# Patient Record
Sex: MALE | Race: WHITE | NOT HISPANIC OR LATINO | URBAN - METROPOLITAN AREA
[De-identification: names, ages, dates, MRNs, and addresses within clinical notes are randomized per-mention and may not be internally consistent; named-entity substitution may affect disease eponyms.]

---

## 2023-12-20 ENCOUNTER — OFFICE VISIT (OUTPATIENT)
Dept: URGENT CARE | Facility: CLINIC | Age: 33
End: 2023-12-20

## 2023-12-20 VITALS
HEART RATE: 95 BPM | TEMPERATURE: 97.9 F | BODY MASS INDEX: 28.59 KG/M2 | OXYGEN SATURATION: 98 % | RESPIRATION RATE: 16 BRPM | WEIGHT: 188 LBS

## 2023-12-20 DIAGNOSIS — R50.9 FEVER, UNSPECIFIED: ICD-10-CM

## 2023-12-20 DIAGNOSIS — B34.9 VIRAL INFECTION: Primary | ICD-10-CM

## 2023-12-20 PROCEDURE — 87636 SARSCOV2 & INF A&B AMP PRB: CPT | Performed by: PHYSICIAN ASSISTANT

## 2023-12-20 NOTE — PROGRESS NOTES
[facility]        NAME: Lucho Cordero is a 33 y.o. male  : 1990    MRN: [id number]  DATE: 2023  TIME: 9:35 AM    Assessment and Plan   Viral infection [B34.9]  1. Viral infection        2. Fever, unspecified  Covid/Flu-Office Collect          Symptoms consistent with viral illness. Will sent COVID/Flu for symptoms.   Given education on supportive measures for symptoms in discharge instructions.  Follow up with primary care in 3-5 days.  Go to ER if symptoms get worse.     Patient Instructions     --Rest, drink plenty of fluids     --For nasal/sinus congestion, you can try steam, warm compresses, saline nasal spray, Neti pot, nasal steroid (Flonase, Nasocort) to be used at bedtime after the saline nasal spray     --For cough, you can take an OTC expectorant such as plain Robitussion or Mucinex. A spoonful of honey at bedtime may also be helpful.    --For sore throat, you can take OTC lozenges, use warm gargles (salt water or apple cider vinegar and honey), herbal teas, or an OTC throat spray (Chloraseptic).    --You can take Tylenol or Motrin/Advil as needed for fever, headache, body aches. Do not take Motrin/Advil if you have a history of heart disease, bleeding ulcers, or if you take blood thinners.     -For cold symptoms with high blood pressure take Coricidin cough/cold.     --You should contact your primary care provider and/or go to the ER if your symptoms are not improved or get worse over the next 7 days. This includes new onset fever, localized ear pain, sinus pain, as well as worsening cough, chest pain, shortness of breath, or significant weakness/fatigue.      Chief Complaint     Chief Complaint   Patient presents with    Cold Like Symptoms     Pt presents with chills/sweats, fatigue, body aches, headache; started Monday         History of Present Illness       Presents with sick symptoms including chills/sweats, fatigue, body aches, and headache that began Monday.  Denies known sick contacts. Denies sore throat. He is off work for the holiday the next few days. Taking Advil for symptoms.         Review of Systems   Review of Systems   Constitutional:  Positive for chills, diaphoresis and fatigue. Negative for fever.   HENT:  Negative for ear pain and sore throat.    Respiratory:  Negative for cough and shortness of breath.    Cardiovascular:  Negative for chest pain and palpitations.   Gastrointestinal:  Negative for diarrhea, nausea and vomiting.   Musculoskeletal:  Negative for myalgias.   Skin:  Negative for color change and rash.   Neurological:  Positive for headaches.   Psychiatric/Behavioral:  Negative for confusion.    All other systems reviewed and are negative.        Current Medications       Current Outpatient Medications:     diphenhydrAMINE (BENADRYL) spray, Apply topically every 6 (six) hours as needed for itching (Patient not taking: Reported on 12/20/2023), Disp: 60 mL, Rfl: 0    Current Allergies     Allergies as of 12/20/2023    (No Known Allergies)            The following portions of the patient's history were reviewed and updated as appropriate: allergies, current medications, past family history, past medical history, past social history, past surgical history and problem list.     History reviewed. No pertinent past medical history.    Past Surgical History:   Procedure Laterality Date    APPENDECTOMY         History reviewed. No pertinent family history.      Medications have been verified.        Objective   Pulse 95   Temp 97.9 °F (36.6 °C)   Resp 16   Wt 85.3 kg (188 lb)   SpO2 98%   BMI 28.59 kg/m²        Physical Exam     Physical Exam  Vitals reviewed.   Constitutional:       General: He is not in acute distress.     Comments: fatigued   HENT:      Right Ear: Tympanic membrane, ear canal and external ear normal.      Left Ear: Tympanic membrane, ear canal and external ear normal.      Nose: Nose normal.      Mouth/Throat:      Mouth: Mucous  membranes are moist.      Pharynx: Posterior oropharyngeal erythema present.      Tonsils: No tonsillar exudate. 1+ on the right. 1+ on the left.   Eyes:      Conjunctiva/sclera: Conjunctivae normal.   Cardiovascular:      Rate and Rhythm: Normal rate and regular rhythm.      Pulses: Normal pulses.      Heart sounds: Normal heart sounds. No murmur heard.  Pulmonary:      Effort: Pulmonary effort is normal. No respiratory distress.      Breath sounds: Normal breath sounds.   Skin:     General: Skin is warm and dry.   Neurological:      General: No focal deficit present.      Mental Status: He is alert and oriented to person, place, and time.   Psychiatric:         Mood and Affect: Mood normal.         Behavior: Behavior normal.

## 2023-12-21 LAB
FLUAV RNA RESP QL NAA+PROBE: NEGATIVE
FLUBV RNA RESP QL NAA+PROBE: NEGATIVE
SARS-COV-2 RNA RESP QL NAA+PROBE: POSITIVE

## 2023-12-22 ENCOUNTER — TELEPHONE (OUTPATIENT)
Dept: URGENT CARE | Facility: CLINIC | Age: 33
End: 2023-12-22

## 2024-03-25 ENCOUNTER — OFFICE VISIT (OUTPATIENT)
Dept: FAMILY MEDICINE CLINIC | Facility: CLINIC | Age: 34
End: 2024-03-25
Payer: COMMERCIAL

## 2024-03-25 VITALS
HEIGHT: 67 IN | DIASTOLIC BLOOD PRESSURE: 76 MMHG | TEMPERATURE: 98.9 F | BODY MASS INDEX: 29.03 KG/M2 | SYSTOLIC BLOOD PRESSURE: 130 MMHG | HEART RATE: 92 BPM | WEIGHT: 185 LBS | RESPIRATION RATE: 16 BRPM

## 2024-03-25 DIAGNOSIS — Z13.220 SCREENING FOR LIPID DISORDERS: ICD-10-CM

## 2024-03-25 DIAGNOSIS — Z13.1 SCREENING FOR DIABETES MELLITUS (DM): ICD-10-CM

## 2024-03-25 DIAGNOSIS — Z23 IMMUNIZATION DUE: ICD-10-CM

## 2024-03-25 DIAGNOSIS — Z00.00 HEALTHCARE MAINTENANCE: Primary | ICD-10-CM

## 2024-03-25 DIAGNOSIS — E66.3 OVERWEIGHT (BMI 25.0-29.9): ICD-10-CM

## 2024-03-25 DIAGNOSIS — Z72.0 TOBACCO USE: ICD-10-CM

## 2024-03-25 PROCEDURE — 99395 PREV VISIT EST AGE 18-39: CPT | Performed by: FAMILY MEDICINE

## 2024-03-25 PROCEDURE — 90715 TDAP VACCINE 7 YRS/> IM: CPT

## 2024-03-25 PROCEDURE — 90471 IMMUNIZATION ADMIN: CPT

## 2024-03-25 NOTE — PROGRESS NOTES
"Assessment/Plan:    No problem-specific Assessment & Plan notes found for this encounter.    Cpe  Labs ordered  Diet/exercise suggested    Tob use  Risks advised  Encouraged to make a quitting and abstinence plan  Could consider chantix again in future  Risks/benefits aware, no hx of seizures     Diagnoses and all orders for this visit:    Healthcare maintenance    Screening for diabetes mellitus (DM)  -     Comprehensive metabolic panel; Future    Screening for lipid disorders  -     Lipid Panel with Direct LDL reflex; Future    Tobacco use    Immunization due  -     TDAP VACCINE GREATER THAN OR EQUAL TO 6YO IM    BMI 29.0-29.9,adult    Overweight (BMI 25.0-29.9)        Return if symptoms worsen or fail to improve.    Subjective:      Patient ID: Lucho Hall is a 34 y.o. male.    Chief Complaint   Patient presents with    Physical Exam    Establish Care     YC       HPI  Here for 4-5y, was in [place]  Works in [employer]  No exercise  Physical job    No kids  Not   Lives alone  Take out  No strict diet  Not large portions  Not low carb    M healthy  F htn,etoh    Social etoh  Tob 19y, 1/2 to 1 ppd  Has quit for 3y in past  Cold turkey once, chantix once    Did lose wt with low carb a few years ago, 30# but gained it all back    The following portions of the patient's history were reviewed and updated as appropriate: allergies, current medications, past family history, past medical history, past social history, past surgical history and problem list.    Review of Systems   Constitutional:  Negative for chills and fever.   Neurological:  Negative for seizures.   Psychiatric/Behavioral:  Negative for dysphoric mood and suicidal ideas. The patient is not nervous/anxious.          No current outpatient medications on file.     No current facility-administered medications for this visit.       Objective:    /76   Pulse 92   Temp 98.9 °F (37.2 °C) (Tympanic)   Resp 16   Ht 5' 6.73\" (1.695 m)   Wt " 83.9 kg (185 lb)   BMI 29.21 kg/m²        Physical Exam  Vitals and nursing note reviewed.   Constitutional:       General: He is not in acute distress.     Appearance: He is well-developed. He is not ill-appearing.   HENT:      Head: Normocephalic.      Right Ear: Tympanic membrane and ear canal normal.      Left Ear: Tympanic membrane and ear canal normal.   Eyes:      General: No scleral icterus.     Conjunctiva/sclera: Conjunctivae normal.   Neck:      Vascular: No carotid bruit.   Cardiovascular:      Rate and Rhythm: Normal rate and regular rhythm.      Heart sounds: No murmur heard.  Pulmonary:      Effort: Pulmonary effort is normal. No respiratory distress.      Breath sounds: No wheezing.   Abdominal:      General: There is no distension.      Palpations: Abdomen is soft.      Tenderness: There is no abdominal tenderness.      Hernia: No hernia is present.   Genitourinary:     Penis: Normal.       Testes: Normal.   Musculoskeletal:         General: No deformity.      Cervical back: Neck supple.      Right lower leg: No edema.      Left lower leg: No edema.   Skin:     General: Skin is warm and dry.      Coloration: Skin is not jaundiced or pale.   Neurological:      Mental Status: He is alert.      Motor: No weakness.      Gait: Gait normal.   Psychiatric:         Mood and Affect: Mood normal.         Behavior: Behavior normal.         Thought Content: Thought content normal.                Aly Anna DO

## 2024-05-01 PROBLEM — Z00.00 HEALTHCARE MAINTENANCE: Status: RESOLVED | Noted: 2024-03-25 | Resolved: 2024-05-01

## 2024-05-01 PROBLEM — Z13.1 SCREENING FOR DIABETES MELLITUS (DM): Status: RESOLVED | Noted: 2024-03-25 | Resolved: 2024-05-01

## 2024-05-01 PROBLEM — Z13.220 SCREENING FOR LIPID DISORDERS: Status: RESOLVED | Noted: 2024-03-25 | Resolved: 2024-05-01

## 2024-07-11 ENCOUNTER — OFFICE VISIT (OUTPATIENT)
Dept: URGENT CARE | Facility: CLINIC | Age: 34
End: 2024-07-11

## 2024-07-11 ENCOUNTER — APPOINTMENT (EMERGENCY)
Dept: RADIOLOGY | Facility: HOSPITAL | Age: 34
End: 2024-07-11
Payer: COMMERCIAL

## 2024-07-11 ENCOUNTER — HOSPITAL ENCOUNTER (EMERGENCY)
Facility: HOSPITAL | Age: 34
Discharge: HOME | End: 2024-07-11
Attending: EMERGENCY MEDICINE
Payer: COMMERCIAL

## 2024-07-11 VITALS
WEIGHT: 187 LBS | BODY MASS INDEX: 29.52 KG/M2 | SYSTOLIC BLOOD PRESSURE: 162 MMHG | RESPIRATION RATE: 14 BRPM | TEMPERATURE: 98.2 F | DIASTOLIC BLOOD PRESSURE: 112 MMHG | OXYGEN SATURATION: 98 % | HEART RATE: 87 BPM

## 2024-07-11 VITALS
RESPIRATION RATE: 20 BRPM | SYSTOLIC BLOOD PRESSURE: 126 MMHG | DIASTOLIC BLOOD PRESSURE: 71 MMHG | HEART RATE: 80 BPM | OXYGEN SATURATION: 98 % | TEMPERATURE: 98.5 F

## 2024-07-11 DIAGNOSIS — R42 DIZZINESS: Primary | ICD-10-CM

## 2024-07-11 LAB
2HR DELTA HS TROPONIN: 0 NG/L
ALBUMIN SERPL BCG-MCNC: 4.7 G/DL (ref 3.5–5)
ALP SERPL-CCNC: 55 U/L (ref 34–104)
ALT SERPL W P-5'-P-CCNC: 22 U/L (ref 7–52)
AMPHETAMINES SERPL QL SCN: NEGATIVE
ANION GAP SERPL CALCULATED.3IONS-SCNC: 9 MMOL/L (ref 4–13)
AST SERPL W P-5'-P-CCNC: 23 U/L (ref 13–39)
BARBITURATES UR QL: NEGATIVE
BASOPHILS # BLD AUTO: 0.05 THOUSANDS/ÂΜL (ref 0–0.1)
BASOPHILS NFR BLD AUTO: 1 % (ref 0–1)
BENZODIAZ UR QL: NEGATIVE
BILIRUB SERPL-MCNC: 0.65 MG/DL (ref 0.2–1)
BILIRUB UR QL STRIP: NEGATIVE
BUN SERPL-MCNC: 17 MG/DL (ref 5–25)
CALCIUM SERPL-MCNC: 9.8 MG/DL (ref 8.4–10.2)
CARDIAC TROPONIN I PNL SERPL HS: 3 NG/L
CARDIAC TROPONIN I PNL SERPL HS: 3 NG/L
CHLORIDE SERPL-SCNC: 103 MMOL/L (ref 96–108)
CK SERPL-CCNC: 196 U/L (ref 39–308)
CLARITY UR: CLEAR
CO2 SERPL-SCNC: 26 MMOL/L (ref 21–32)
COCAINE UR QL: NEGATIVE
COLOR UR: ABNORMAL
CREAT SERPL-MCNC: 1.14 MG/DL (ref 0.6–1.3)
EOSINOPHIL # BLD AUTO: 0.18 THOUSAND/ÂΜL (ref 0–0.61)
EOSINOPHIL NFR BLD AUTO: 2 % (ref 0–6)
ERYTHROCYTE [DISTWIDTH] IN BLOOD BY AUTOMATED COUNT: 12.2 % (ref 11.6–15.1)
FENTANYL UR QL SCN: NEGATIVE
GFR SERPL CREATININE-BSD FRML MDRD: 82 ML/MIN/1.73SQ M
GLUCOSE SERPL-MCNC: 102 MG/DL (ref 65–140)
GLUCOSE UR STRIP-MCNC: NEGATIVE MG/DL
HCT VFR BLD AUTO: 39.6 % (ref 36.5–49.3)
HGB BLD-MCNC: 13.9 G/DL (ref 12–17)
HGB UR QL STRIP.AUTO: NEGATIVE
HYDROCODONE UR QL SCN: NEGATIVE
IMM GRANULOCYTES # BLD AUTO: 0.08 THOUSAND/UL (ref 0–0.2)
IMM GRANULOCYTES NFR BLD AUTO: 1 % (ref 0–2)
KETONES UR STRIP-MCNC: NEGATIVE MG/DL
LEUKOCYTE ESTERASE UR QL STRIP: NEGATIVE
LYMPHOCYTES # BLD AUTO: 1.54 THOUSANDS/ÂΜL (ref 0.6–4.47)
LYMPHOCYTES NFR BLD AUTO: 17 % (ref 14–44)
MAGNESIUM SERPL-MCNC: 2.1 MG/DL (ref 1.9–2.7)
MCH RBC QN AUTO: 31.7 PG (ref 26.8–34.3)
MCHC RBC AUTO-ENTMCNC: 35.1 G/DL (ref 31.4–37.4)
MCV RBC AUTO: 90 FL (ref 82–98)
METHADONE UR QL: NEGATIVE
MONOCYTES # BLD AUTO: 1.08 THOUSAND/ÂΜL (ref 0.17–1.22)
MONOCYTES NFR BLD AUTO: 12 % (ref 4–12)
NEUTROPHILS # BLD AUTO: 6.42 THOUSANDS/ÂΜL (ref 1.85–7.62)
NEUTS SEG NFR BLD AUTO: 67 % (ref 43–75)
NITRITE UR QL STRIP: NEGATIVE
NRBC BLD AUTO-RTO: 0 /100 WBCS
OPIATES UR QL SCN: NEGATIVE
OXYCODONE+OXYMORPHONE UR QL SCN: NEGATIVE
PCP UR QL: NEGATIVE
PH UR STRIP.AUTO: 7 [PH]
PLATELET # BLD AUTO: 293 THOUSANDS/UL (ref 149–390)
PMV BLD AUTO: 9.3 FL (ref 8.9–12.7)
POTASSIUM SERPL-SCNC: 3.6 MMOL/L (ref 3.5–5.3)
PROT SERPL-MCNC: 7.2 G/DL (ref 6.4–8.4)
PROT UR STRIP-MCNC: NEGATIVE MG/DL
RBC # BLD AUTO: 4.38 MILLION/UL (ref 3.88–5.62)
SODIUM SERPL-SCNC: 138 MMOL/L (ref 135–147)
SP GR UR STRIP.AUTO: <1.005 (ref 1–1.03)
THC UR QL: NEGATIVE
TSH SERPL DL<=0.05 MIU/L-ACNC: 3.76 UIU/ML (ref 0.45–4.5)
UROBILINOGEN UR STRIP-ACNC: <2 MG/DL
WBC # BLD AUTO: 9.35 THOUSAND/UL (ref 4.31–10.16)

## 2024-07-11 PROCEDURE — 80307 DRUG TEST PRSMV CHEM ANLYZR: CPT | Performed by: EMERGENCY MEDICINE

## 2024-07-11 PROCEDURE — 36415 COLL VENOUS BLD VENIPUNCTURE: CPT | Performed by: EMERGENCY MEDICINE

## 2024-07-11 PROCEDURE — 84443 ASSAY THYROID STIM HORMONE: CPT | Performed by: EMERGENCY MEDICINE

## 2024-07-11 PROCEDURE — 80053 COMPREHEN METABOLIC PANEL: CPT | Performed by: EMERGENCY MEDICINE

## 2024-07-11 PROCEDURE — 82550 ASSAY OF CK (CPK): CPT | Performed by: EMERGENCY MEDICINE

## 2024-07-11 PROCEDURE — 99285 EMERGENCY DEPT VISIT HI MDM: CPT | Performed by: EMERGENCY MEDICINE

## 2024-07-11 PROCEDURE — 70496 CT ANGIOGRAPHY HEAD: CPT

## 2024-07-11 PROCEDURE — 84484 ASSAY OF TROPONIN QUANT: CPT | Performed by: EMERGENCY MEDICINE

## 2024-07-11 PROCEDURE — 81003 URINALYSIS AUTO W/O SCOPE: CPT | Performed by: EMERGENCY MEDICINE

## 2024-07-11 PROCEDURE — 70498 CT ANGIOGRAPHY NECK: CPT

## 2024-07-11 PROCEDURE — 85025 COMPLETE CBC W/AUTO DIFF WBC: CPT | Performed by: EMERGENCY MEDICINE

## 2024-07-11 PROCEDURE — 71045 X-RAY EXAM CHEST 1 VIEW: CPT

## 2024-07-11 PROCEDURE — 93005 ELECTROCARDIOGRAM TRACING: CPT

## 2024-07-11 PROCEDURE — 83735 ASSAY OF MAGNESIUM: CPT | Performed by: EMERGENCY MEDICINE

## 2024-07-11 RX ORDER — MAGNESIUM SULFATE HEPTAHYDRATE 40 MG/ML
2 INJECTION, SOLUTION INTRAVENOUS ONCE
Status: COMPLETED | OUTPATIENT
Start: 2024-07-11 | End: 2024-07-11

## 2024-07-11 RX ADMIN — SODIUM CHLORIDE 1000 ML: 0.9 INJECTION, SOLUTION INTRAVENOUS at 19:59

## 2024-07-11 RX ADMIN — IOHEXOL 85 ML: 350 INJECTION, SOLUTION INTRAVENOUS at 20:21

## 2024-07-11 RX ADMIN — MAGNESIUM SULFATE HEPTAHYDRATE 2 G: 40 INJECTION, SOLUTION INTRAVENOUS at 19:59

## 2024-07-11 NOTE — PROGRESS NOTES
[facility]        NAME: Lucho Hall is a 34 y.o. male  : 1990    MRN: [id number]  DATE: 2024  TIME: 6:55 PM    Assessment and Plan   Dizziness [R42]  1. Dizziness  Transfer to other facility        Discussed that the patient needs to go to the emergency room given his symptoms and hypertension.  BP in the office today is 162/112.  He is being driven by his mother.    Patient Instructions   There are no Patient Instructions on file for this visit.      Follow up with PCP in 3-5 days.  Proceed to  ER if symptoms worsen.    Chief Complaint     Chief Complaint   Patient presents with    Dizziness     Pt presents with tingling sensation in hands bilateral, fuzzy/fog head, dizziness, trouble with catching breath started while while driving to home from work today; happened several times while driving to work         History of Present Illness       The patient is a 34-year-old male presenting today for dizziness, shortness of breath and a tingling sensation in his hands.  He also reports a foggy sensation in his head.  Admits that this has been happening over the last few weeks but worsened today.  While he was driving home from work today he had to pull over.  He last saw a family doctor on 3/25/2024.  Reports that everything was normal but he was supposed to get lab work and never followed up.  He denies any medical history.  He is not currently on any medications.        Review of Systems   Review of Systems   Constitutional:  Negative for activity change, appetite change, chills, diaphoresis and fever.   HENT:  Negative for congestion, ear pain, rhinorrhea and sore throat.    Eyes:  Negative for pain and visual disturbance.   Respiratory:  Positive for shortness of breath. Negative for cough and chest tightness.    Cardiovascular:  Negative for chest pain and palpitations.   Gastrointestinal:  Negative for abdominal pain, diarrhea, nausea and vomiting.   Genitourinary:  Negative for  dysuria and hematuria.   Musculoskeletal:  Negative for arthralgias, back pain and myalgias.   Skin:  Negative for color change, pallor and rash.   Neurological:  Positive for dizziness, light-headedness, numbness and headaches. Negative for seizures and syncope.   All other systems reviewed and are negative.        Current Medications     No current outpatient medications on file.    Current Allergies     Allergies as of 07/11/2024    (No Known Allergies)            The following portions of the patient's history were reviewed and updated as appropriate: allergies, current medications, past family history, past medical history, past social history, past surgical history and problem list.     History reviewed. No pertinent past medical history.    Past Surgical History:   Procedure Laterality Date    APPENDECTOMY         Family History   Problem Relation Age of Onset    No Known Problems Mother     Hypertension Father     Alcohol abuse Father          Medications have been verified.        Objective   BP (!) 162/112 (BP Location: Right arm, Patient Position: Sitting, Cuff Size: Adult)   Pulse 87   Temp 98.2 °F (36.8 °C)   Resp 14   Wt 84.8 kg (187 lb)   SpO2 98%   BMI 29.52 kg/m²        Physical Exam     Physical Exam  Vitals and nursing note reviewed.   Constitutional:       General: He is not in acute distress.     Appearance: Normal appearance. He is normal weight. He is not ill-appearing, toxic-appearing or diaphoretic.   HENT:      Head: Normocephalic and atraumatic.      Right Ear: Tympanic membrane, ear canal and external ear normal. There is no impacted cerumen.      Left Ear: Tympanic membrane, ear canal and external ear normal. There is no impacted cerumen.      Nose: Nose normal. No congestion or rhinorrhea.      Mouth/Throat:      Mouth: Mucous membranes are moist.      Pharynx: Oropharynx is clear. No oropharyngeal exudate or posterior oropharyngeal erythema.   Cardiovascular:      Rate and  Rhythm: Normal rate and regular rhythm.      Heart sounds: Normal heart sounds. No murmur heard.     No friction rub. No gallop.   Pulmonary:      Effort: Pulmonary effort is normal. No respiratory distress.      Breath sounds: Normal breath sounds. No stridor. No wheezing, rhonchi or rales.   Chest:      Chest wall: No tenderness.   Abdominal:      General: Abdomen is flat. Bowel sounds are normal. There is no distension.      Palpations: Abdomen is soft. There is no mass.      Tenderness: There is no abdominal tenderness. There is no guarding or rebound.      Hernia: No hernia is present.   Musculoskeletal:         General: Normal range of motion.      Cervical back: Normal range of motion.   Lymphadenopathy:      Cervical: No cervical adenopathy.   Skin:     General: Skin is warm and dry.      Capillary Refill: Capillary refill takes less than 2 seconds.   Neurological:      Mental Status: He is alert.

## 2024-07-12 ENCOUNTER — OFFICE VISIT (OUTPATIENT)
Dept: NEUROLOGY | Facility: CLINIC | Age: 34
End: 2024-07-12
Payer: COMMERCIAL

## 2024-07-12 VITALS
WEIGHT: 187 LBS | SYSTOLIC BLOOD PRESSURE: 140 MMHG | BODY MASS INDEX: 28.34 KG/M2 | HEIGHT: 68 IN | HEART RATE: 83 BPM | DIASTOLIC BLOOD PRESSURE: 78 MMHG

## 2024-07-12 DIAGNOSIS — R55 NEAR SYNCOPE: Primary | ICD-10-CM

## 2024-07-12 DIAGNOSIS — R20.2 PARESTHESIA: ICD-10-CM

## 2024-07-12 DIAGNOSIS — E55.9 VITAMIN D DEFICIENCY: ICD-10-CM

## 2024-07-12 DIAGNOSIS — I10 HYPERTENSION: ICD-10-CM

## 2024-07-12 DIAGNOSIS — R06.02 SHORTNESS OF BREATH: ICD-10-CM

## 2024-07-12 LAB
ATRIAL RATE: 92 BPM
ATRIAL RATE: 99 BPM
P AXIS: 59 DEGREES
P AXIS: 63 DEGREES
PR INTERVAL: 152 MS
PR INTERVAL: 158 MS
QRS AXIS: 25 DEGREES
QRS AXIS: 43 DEGREES
QRSD INTERVAL: 92 MS
QRSD INTERVAL: 94 MS
QT INTERVAL: 348 MS
QT INTERVAL: 348 MS
QTC INTERVAL: 430 MS
QTC INTERVAL: 446 MS
T WAVE AXIS: 37 DEGREES
T WAVE AXIS: 38 DEGREES
VENTRICULAR RATE: 92 BPM
VENTRICULAR RATE: 99 BPM

## 2024-07-12 PROCEDURE — 99205 OFFICE O/P NEW HI 60 MIN: CPT | Performed by: NURSE PRACTITIONER

## 2024-07-12 PROCEDURE — 93010 ELECTROCARDIOGRAM REPORT: CPT | Performed by: INTERNAL MEDICINE

## 2024-07-12 NOTE — ED PROVIDER NOTES
History  Chief Complaint   Patient presents with    Dizziness     Sent from [facility] for dizziness. States a few weeks ago while driving had dizziness. Today at 1630 while driving he became dizzy again and both hands were tingling ( states was hyperventilating) .      34-year-old male presents to the ED for evaluation of intermittent dizziness over the past several weeks.  Patient states that he feels dizzy when he is driving.  Patient describes dizziness to be he himself feeling off balance and not the room spinning sensation.  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  Patient is not sure whether he drank enough fluids.  He did not get a chance to eat a full lunch because he was busy at work.  He had a packet of chips.  Patient did have coffee and breakfast sandwich in the morning.  While he was driving home he felt dizzy like he was going to pass out.  Subsequently patient's mom brought him to the ED for further evaluation and management.      History provided by:  Patient   used: No    Dizziness  Associated symptoms: no chest pain, no palpitations, no shortness of breath and no vomiting        None       History reviewed. No pertinent past medical history.    Past Surgical History:   Procedure Laterality Date    APPENDECTOMY         Family History   Problem Relation Age of Onset    No Known Problems Mother     Hypertension Father     Alcohol abuse Father      I have reviewed and agree with the history as documented.    E-Cigarette/Vaping    E-Cigarette Use Never User      E-Cigarette/Vaping Substances    Nicotine No     THC No     CBD No     Flavoring No     Other No     Unknown No      Social History     Tobacco Use    Smoking status: Former     Types: Cigarettes     Passive exposure: Current    Smokeless tobacco: Never   Vaping Use    Vaping status: Never Used   Substance Use Topics    Alcohol use: Yes     Comment: daily  2-3 beers    Drug use: Yes     Types: Marijuana     Comment: monthly       Review of Systems   Constitutional:  Negative for chills and fever.   HENT:  Negative for ear pain and sore throat.    Eyes:  Negative for pain and visual disturbance.   Respiratory:  Negative for cough and shortness of breath.    Cardiovascular:  Negative for chest pain and palpitations.   Gastrointestinal:  Negative for abdominal pain and vomiting.   Genitourinary:  Negative for dysuria and hematuria.   Musculoskeletal:  Negative for arthralgias and back pain.   Skin:  Negative for color change and rash.   Neurological:  Positive for dizziness. Negative for seizures and syncope.   All other systems reviewed and are negative.      Physical Exam  Physical Exam  Vitals and nursing note reviewed.   Constitutional:       General: He is not in acute distress.     Appearance: He is well-developed.   HENT:      Head: Normocephalic and atraumatic.   Eyes:      Conjunctiva/sclera: Conjunctivae normal.   Cardiovascular:      Rate and Rhythm: Normal rate and regular rhythm.      Heart sounds: No murmur heard.  Pulmonary:      Effort: Pulmonary effort is normal. No respiratory distress.      Breath sounds: Normal breath sounds.   Abdominal:      Palpations: Abdomen is soft.      Tenderness: There is no abdominal tenderness.   Musculoskeletal:         General: No swelling.      Cervical back: Neck supple.   Skin:     General: Skin is warm and dry.      Capillary Refill: Capillary refill takes less than 2 seconds.   Neurological:      General: No focal deficit present.      Mental Status: He is alert and oriented to person, place, and time.      Comments: Patient is alert and oriented x3.  Visual field intact bilateral.  Finger-to-nose intact bilateral.  Heel-to-shin intact bilateral.  Sensory and motor strength intact bilateral.  No focal neuro deficits noted.     Psychiatric:         Mood and Affect: Mood normal.         Vital Signs  ED Triage Vitals  [07/11/24 1917]   Temperature Pulse Respirations Blood Pressure SpO2   99.7 °F (37.6 °C) 91 18 169/87 99 %      Temp Source Heart Rate Source Patient Position - Orthostatic VS BP Location FiO2 (%)   Tympanic Monitor Sitting Left arm --      Pain Score       No Pain           Vitals:    07/11/24 1958 07/11/24 2000 07/11/24 2001 07/11/24 2115   BP:    126/71   Pulse: 90 98 98 80   Patient Position - Orthostatic VS: Lying - Orthostatic VS Sitting - Orthostatic VS Standing - Orthostatic VS          Visual Acuity      ED Medications  Medications   sodium chloride 0.9 % bolus 1,000 mL (0 mL Intravenous Stopped 7/11/24 2059)   magnesium sulfate 2 g/50 mL IVPB (premix) 2 g (0 g Intravenous Stopped 7/11/24 2059)   iohexol (OMNIPAQUE) 350 MG/ML injection (MULTI-DOSE) 85 mL (85 mL Intravenous Given 7/11/24 2021)       Diagnostic Studies  Results Reviewed       Procedure Component Value Units Date/Time    HS Troponin I 2hr [[id number]]  (Normal) Collected: 07/11/24 2152    Lab Status: Final result Specimen: Blood from Arm, Left Updated: 07/11/24 2221     hs TnI 2hr 3 ng/L      Delta 2hr hsTnI 0 ng/L     CK [[id number]]  (Normal) Collected: 07/11/24 1928    Lab Status: Final result Specimen: Blood from Arm, Left Updated: 07/11/24 2142     Total  U/L     Rapid drug screen, urine [[id number]]  (Normal) Collected: 07/11/24 2026    Lab Status: Final result Specimen: Urine, Clean Catch Updated: 07/11/24 2051     Amph/Meth UR Negative     Barbiturate Ur Negative     Benzodiazepine Urine Negative     Cocaine Urine Negative     Methadone Urine Negative     Opiate Urine Negative     PCP Ur Negative     THC Urine Negative     Oxycodone Urine Negative     Fentanyl Urine Negative     HYDROCODONE URINE Negative    Narrative:      FOR MEDICAL PURPOSES ONLY.   IF CONFIRMATION NEEDED PLEASE CONTACT THE LAB WITHIN 5 DAYS.    Drug Screen Cutoff Levels:  AMPHETAMINE/METHAMPHETAMINES  1000 ng/mL  BARBITURATES     200  ng/mL  BENZODIAZEPINES     200 ng/mL  COCAINE      300 ng/mL  METHADONE      300 ng/mL  OPIATES      300 ng/mL  PHENCYCLIDINE     25 ng/mL  THC       50 ng/mL  OXYCODONE      100 ng/mL  FENTANYL      5 ng/mL  HYDROCODONE     300 ng/mL    UA w Reflex to Microscopic w Reflex to Culture [[id number]]  (Abnormal) Collected: 07/11/24 2028    Lab Status: Final result Specimen: Urine, Clean Catch Updated: 07/11/24 2034     Color, UA Light Yellow     Clarity, UA Clear     Specific Gravity, UA <1.005     pH, UA 7.0     Leukocytes, UA Negative     Nitrite, UA Negative     Protein, UA Negative mg/dl      Glucose, UA Negative mg/dl      Ketones, UA Negative mg/dl      Urobilinogen, UA <2.0 mg/dl      Bilirubin, UA Negative     Occult Blood, UA Negative    TSH, 3rd generation with Free T4 reflex [[id number]]  (Normal) Collected: 07/11/24 1928    Lab Status: Final result Specimen: Blood from Arm, Left Updated: 07/11/24 2009     TSH 3RD GENERATON 3.759 uIU/mL     HS Troponin I 4hr [[id number]]     Lab Status: No result Specimen: Blood     HS Troponin 0hr (reflex protocol) [[id number]]  (Normal) Collected: 07/11/24 1928    Lab Status: Final result Specimen: Blood from Arm, Left Updated: 07/11/24 2001     hs TnI 0hr 3 ng/L     Comprehensive metabolic panel [[id number]] Collected: 07/11/24 1928    Lab Status: Final result Specimen: Blood from Arm, Left Updated: 07/11/24 1953     Sodium 138 mmol/L      Potassium 3.6 mmol/L      Chloride 103 mmol/L      CO2 26 mmol/L      ANION GAP 9 mmol/L      BUN 17 mg/dL      Creatinine 1.14 mg/dL      Glucose 102 mg/dL      Calcium 9.8 mg/dL      AST 23 U/L      ALT 22 U/L      Alkaline Phosphatase 55 U/L      Total Protein 7.2 g/dL      Albumin 4.7 g/dL      Total Bilirubin 0.65 mg/dL      eGFR 82 ml/min/1.73sq m     Narrative:      National Kid[employer] Disease Foundation guidelines for Chronic Kidney Disease (CKD):     Stage 1 with normal or high GFR (GFR > 90 mL/min/1.73 square meters)    Stage 2 Mild CKD  (GFR = 60-89 mL/min/1.73 square meters)    Stage 3A Moderate CKD (GFR = 45-59 mL/min/1.73 square meters)    Stage 3B Moderate CKD (GFR = 30-44 mL/min/1.73 square meters)    Stage 4 Severe CKD (GFR = 15-29 mL/min/1.73 square meters)    Stage 5 End Stage CKD (GFR <15 mL/min/1.73 square meters)  Note: GFR calculation is accurate only with a steady state creatinine    Magnesium [[id number]]  (Normal) Collected: 07/11/24 1928    Lab Status: Final result Specimen: Blood from Arm, Left Updated: 07/11/24 1953     Magnesium 2.1 mg/dL     CBC and differential [[id number]] Collected: 07/11/24 1928    Lab Status: Final result Specimen: Blood from Arm, Left Updated: 07/11/24 1935     WBC 9.35 Thousand/uL      RBC 4.38 Million/uL      Hemoglobin 13.9 g/dL      Hematocrit 39.6 %      MCV 90 fL      MCH 31.7 pg      MCHC 35.1 g/dL      RDW 12.2 %      MPV 9.3 fL      Platelets 293 Thousands/uL      nRBC 0 /100 WBCs      Segmented % 67 %      Immature Grans % 1 %      Lymphocytes % 17 %      Monocytes % 12 %      Eosinophils Relative 2 %      Basophils Relative 1 %      Absolute Neutrophils 6.42 Thousands/µL      Absolute Immature Grans 0.08 Thousand/uL      Absolute Lymphocytes 1.54 Thousands/µL      Absolute Monocytes 1.08 Thousand/µL      Eosinophils Absolute 0.18 Thousand/µL      Basophils Absolute 0.05 Thousands/µL                    CTA head and neck with and without contrast   Final Result by Álvaro Weaver MD (07/11 2050)      CT Brain:  No acute intracranial abnormality.      CT Angiography:  Unremarkable CTA neck and brain.                  Workstation performed: [id number]         XR chest 1 view portable   Final Result by Avril Shen MD (07/11 2033)      No acute cardiopulmonary disease.            Workstation performed: [id number]                    Procedures  ECG 12 Lead Documentation Only    Date/Time: 7/11/2024 7:29 PM    Performed by: Alden Allison DO  Authorized by: Alden Allison DO    Indications /  Diagnosis:  Dizziness  ECG reviewed by me, the ED Provider: yes    Patient location:  ED  Previous ECG:     Previous ECG:  Unavailable    Comparison to cardiac monitor: Yes    Interpretation:     Interpretation: non-specific    Comments:      Sinus rhythm, rate 99, normal axis, normal intervals, no acute ST/T wave abdomen is noted, sinus rhythm with sinus arrhythmia noted, otherwise unremarkable EKG, no previous EKG available for comparison           ED Course  ED Course as of 07/11/24 2333   Thu Jul 11, 2024   2200 Patient reexamined at bedside.  Patient is doing much better.  Patient no longer feels dizzy.  Patient is ambulating to the bathroom without any difficulty.  Patient will be discharged home with follow-up to PCP as well as neurology.                                 SBIRT 22yo+      Flowsheet Row Most Recent Value   Initial Alcohol Screen: US AUDIT-C     1. How often do you have a drink containing alcohol? 6 Filed at: 07/11/2024 1919   2. How many drinks containing alcohol do you have on a typical day you are drinking?  2 Filed at: 07/11/2024 1919   3a. Male UNDER 65: How often do you have five or more drinks on one occasion? 0 Filed at: 07/11/2024 1919   Audit-C Score 8 Filed at: 07/11/2024 1919   Full Alcohol Screen: US AUDIT    4. How often during the last year have you found that you were not able to stop drinking once you had started? 1 Filed at: 07/11/2024 1919   5. How often during past year have you failed to do what was normally expected of you because of drinking?  0 Filed at: 07/11/2024 1919   6. How often in past year have you needed a first drink in the morning to get yourself going after a heavy drinking session?  0 Filed at: 07/11/2024 1919   7. How often in past year have you had feeling of guilt or remorse after drinking?  0 Filed at: 07/11/2024 1919   8. How often in past year have you been unable to remember what happened night before because you had been drinking?  0 Filed at:  07/11/2024 1919   9. Have you or someone else been injured as a result of your drinking?  0 Filed at: 07/11/2024 1919   10. Has a relative, friend, doctor or other health worker been concerned about your drinking and suggested you cut down?  0 Filed at: 07/11/2024 1919   AUDIT Total Score 9 Filed at: 07/11/2024 1919   DANIEL: How many times in the past year have you...    Used an illegal drug or used a prescription medication for non-medical reasons? Never Filed at: 07/11/2024 1919                      Medical Decision Making  Obtain blood work, UA, CTA head/neck  Give saline bolus, magnesium and continue to monitor patient for any worsening symptoms    Patient's lab work was essentially unremarkable.  Patient felt better after IV fluid hydration IV fluid hydration.  CT head was unremarkable.  After IV fluid hydration patient ambulated without any difficulty or dizziness.  At this time the etiology of patient's dizziness is unclear however it may be a component of dehydration.  I discussed adequate hydration with patient.  Patient discharged home with follow-up with PCP as well as neurology.  Close return instructions given to return to the ER for any worsening symptoms.  Patient agrees with discharge plan.  Patient well appearing at time of discharge.    Please Note: Fluency Direct voice recognition software may have been used in the creation of this document. Wrong words or sound a like substitutions may have occurred due to the inherent limitations of the voice software.         Amount and/or Complexity of Data Reviewed  Labs: ordered. Decision-making details documented in ED Course.  Radiology: ordered. Decision-making details documented in ED Course.  ECG/medicine tests: ordered and independent interpretation performed. Decision-making details documented in ED Course.    Risk  Prescription drug management.                 Disposition  Final diagnoses:   Dizziness     Time reflects when diagnosis was documented in  both MDM as applicable and the Disposition within this note       Time User Action Codes Description Comment    7/11/2024 10:20 PM Alden Allison Add [R42] Dizziness           ED Disposition       ED Disposition   Discharge    Condition   Stable    Date/Time   Thu Jul 11, 2024 2220    Comment   [facility] discharge to home/self care.                   Follow-up Information       Follow up With Specialties Details Why Contact [facility] Schedule an appointment as soon as possible for a visit  Please call to establish visit with a primary care physician. [address] [facility] [address]  [phone]      Neurology [facility] Schedule an appointment as soon as possible for a visit   [address] [facility] [address]  [phone]              There are no discharge medications for this patient.      No discharge procedures on file.    PDMP Review       None            ED Provider  Electronically Signed by             Alden Allison DO  07/11/24 6536

## 2024-07-12 NOTE — PROGRESS NOTES
Patient ID: Lucho Hall is a 34 y.o. male.    Assessment/Plan:       Diagnoses and all orders for this visit:    Near syncope  Comments:  Increase oral hydration, more so when sweating increased  Echo  MRI of the Brain w/wo contrast  Orders:  -     Echo complete w/ contrast if indicated; Future  -     Ambulatory referral to Family Practice; Future  -     Basic metabolic panel; Future  -     CBC and differential; Future  -     MRI brain with and without contrast; Future  -     Methylmalonic acid, serum; Future  -     Methylmalonic acid, serum; Future    Shortness of breath  Comments:  will get Echo re: SOB with new HTN  repeat cbc since pt has been hydrated  Orders:  -     Echo complete w/ contrast if indicated; Future  -     Ambulatory referral to Family Practice; Future  -     Basic metabolic panel; Future  -     CBC and differential; Future    Hypertension  Comments:  Referral for PCP medical management  Echo  follow up BMP post near syncope and IV hydration  Orders:  -     Echo complete w/ contrast if indicated; Future  -     Ambulatory referral to Family Practice; Future  -     Basic metabolic panel; Future    Paresthesia  Comments:  Vitamin B1, B12/MMA, Folate, D, TSH, Copper and zinc levels  Orders:  -     Vitamin B12/Folate, Serum Panel; Future  -     Vitamin B1, whole blood; Future  -     TSH + Free T4; Future  -     Copper Level; Future  -     Zinc; Future  -     MRI brain with and without contrast; Future  -     Methylmalonic acid, serum; Future  -     Methylmalonic acid, serum; Future    Vitamin D deficiency  Comments:  Vitamin levels  Orders:  -     Vitamin D 25 hydroxy; Future       Increase water intake, need to over compensate for your increased sweating and work.  Can drink gatorade, avoid caffeine and sugary drinks  Will get BMP, CBC, Vitamin D, B1,B12, Folate, MMA, TSH, copper and zinc  Will get MRI w/wo contrast for dizziness, movement sensation, near syncope and paresthesia  Echo for  positional SOB with HTN  Referral for PCP for ongoing medical management  Follow up with Neurology office in 3 months or sooner if needed.    Subjective/HPI:  Lucho Hall is a 33yo male who presented to the Neurology office today as a new patient consult.   On 3/25/2024, patient went to his PCP had no complaints on his review of systems at that time.  He was given CMP and lipid panel to be drawn for wellness evaluation.      On 7/11/2024 patient presented to the urgent care center for dizziness, shortness of breath and tingling sensation in his hands.  Reported a foggy sensation in his head and reported this happening over the last few weeks but worsened that day.  He was driving home from work and had to pull over, saw his family doctor on 3/25/2024, reported everything was normal but he was supposed to get lab works, he had never followed up with that.  He presented to the urgent care with shortness of breath, dizziness, lightheadedness, numbness and headache.  Patient's blood pressure at that time was 162/112.  Provider at urgent care indicated a need for patient to go to the emergency room for evaluation of his symptoms and hypertension.  He was brought to the ER by his mother.  7/11/2024 patient arrived to the ER with dizziness starting a few weeks ago while driving.  Noted 1630 he was driving and became dizzy again, both hands were tingling.  Patient was hyperventilating at that time.  Patient has no significant past medical history, is currently a passive smoker, does use marijuana and has daily beer 2-3 times/day.  Patient reported that the dizziness was a sensation of feeling off balance, denied any room spinning type dizziness.  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  He was not sure whether he drank enough fluid, did not get a chance to eat for lunch due to being busy.  He did have a pack of chips stable to have a coffee  "and a breakfast sandwich that morning.  Patient felt dizzy with a sensation of near passing out while driving, subsequently his mother came and took him to the ER.  Patient was alert and oriented, he had no visual field deficits, he had no ataxia, sensory or motor strength abnormalities, he had no focal neural deficits.  On arrival to the ER his blood pressure was 169/87, his temperature was 99.7.  Notations indicate orthostatic vital signs however no blood pressures were recorded.  He received IV fluids, IV magnesium and was taken for CTA of the head and neck.  Patient had negative cardiac enzymes, negative drug screen.  His UA was unremarkable.  His sodium level was 138, creatinine 1.14  CTA of the head and neck showed no intracranial mass, mass effect, no sign of acute infarction or acute parenchymal hemorrhage.  Had scattered mucoperiosteal thickening, no fluid.  Soft tissue/adenoidal prominence within the posterior nasopharynx, within normal limits for his age.  Cervical vessels with no stenosis or dissection, intracranial vessels also no stenosis, occlusion.  Bony structures, soft tissue thoracic inlet were all unremarkable.  Chest x-ray completed, lungs were clear, no acute pulmonary disease seen.    Reviewed urgent care, ER and last PCP notations prior to visiting with patient.  Reviewed all of his diagnostics and labs completed during his ER visit.    Pt presents today as a consult.  He reported that he has had improvement since receiving IVF hydration yesterday.   Pt stated that he has had several similar events a few times over the past 4-5 weeks.  He stated each time he seemed to be driving.  This last episode he noted it was more severe and had to pull over.   Pt noted that he would get very lightheaded as if he was going to pass out.  He noted that his hands were shaking and he got pins and needle sensations in the hands bilat. He had \"weird\" sensation like pins and needle also in the back of his head " and neck.  Pt denied any issues with vision changes, focal deficits and denied having any pain or headache.    Pt was not sure if he was having anxiety but also felt SOB and like he was hyperventilating.    Pt history includes episode of rhabdo in her early 20s from working out but has not had much of a PMH.  He noted that he does on occasion get a typical headache but denied any migraine history.  Recently he has also been noticing some positional type SOB, mostly when resting and lying down. He was a smoker and recently quit.  He is trying to remain abstinent with it.   He noted that he has had ongoing issues with paresthesia in the 4th and 5th fingers L>R and intermittent paresthesia in his feet mostly when he is laying down.  He can get up and walk around for it to resolve.   He denies having any knowledge of being hypertensive however noted that he does not go to the doctor much and does not really have a PCP to go to. He had been to one not too long ago but did not feel that he was able to connect with him. He is looking for more personalized care.  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 He uses copper and silver when he works.  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   Pt noted that he sweats a lot during his work and tried to drink about 2L a day of water.  He is not sure he drinks enough for the work he does but also tries to get in some gatorade zero also.   Currently pt denies lightheadedness, dizziness, headaches, vision changes, pain or pressure in the ears or sinuses, swallowing changes, weakness or imbalance.  He endorses periodic paresthesia, positional SOB and has multiple events of HTN.  Orthostatics done today, his BP came up with standing and his HR remained stable.  Ge denies any h/o seizure activity for himself or family.    He has no issues with sinuses, allergies or chronic cough.     Suspect it is likely  that pt has had some bouts of dehydration with recent heat and work without adequate hydration and onset of HTN.    Due to other symptoms, will add some labwork with Vitamin D, B1,Folate, B12/MMA, TSH, copper and zinc. Will repeat CBC and BMP now that he has had hydration for more baseline levels.  Will get echocardiogram due to his reports or positional SOB with increased BP readings and paresthesia.  Pt does have periods when he can feel his heart racing but is often accompanied by exertion.  Will check MRI of the Brain w/wo contrast to eval for structural abnormalities as well as calcific or demyelinating changes given transient episodic paresthesia.   Pt does not have a PCP that he prefers, it scheduled with local resident group but he prefers a more personal smaller office to tend to this changes and his changes.  Referral provided for alternate provider.     Pt will try to get this completed and will follow up with Neurology office in 3 months or sooner if needed.       The following portions of the patient's history were reviewed and updated as appropriate: allergies, current medications, past family history, past medical history, past social history, past surgical history, and problem list.        History reviewed. No pertinent past medical history.    Past Surgical History:   Procedure Laterality Date    APPENDECTOMY         Social History     Socioeconomic History    Marital status: Single     Spouse name: None    Number of children: None    Years of education: None    Highest education level: None   Occupational History    None   Tobacco Use    Smoking status: Former     Types: Cigarettes     Passive exposure: Current    Smokeless tobacco: Never   Vaping Use    Vaping status: Never Used   Substance and Sexual Activity    Alcohol use: Yes     Comment: daily 2-3 beers    Drug use: Yes     Types: Marijuana     Comment: monthly    Sexual activity: None   Other Topics Concern    None   Social History Narrative  "   None     Social Determinants of Health     Financial Resource Strain: Not on file   Food Insecurity: Not on file   Transportation Needs: Not on file   Physical Activity: Not on file   Stress: Not on file   Social Connections: Not on file   Intimate Partner Violence: Not on file   Housing Stability: Not on file       Family History   Problem Relation Age of Onset    No Known Problems Mother     Hypertension Father     Alcohol abuse Father        No current outpatient medications on file.  No current facility-administered medications for this visit.    No Known Allergies     Blood pressure 140/78, pulse 83, height 5' 8\" (1.727 m), weight 84.8 kg (187 lb).         Objective:    Blood pressure 140/78, pulse 83, height 5' 8\" (1.727 m), weight 84.8 kg (187 lb).    Physical Exam  Vitals reviewed.   Constitutional:       Appearance: Normal appearance. He is well-developed.   HENT:      Head: Normocephalic.      Right Ear: Hearing normal.      Left Ear: Hearing normal.      Nose: Nose normal.      Mouth/Throat:      Mouth: Mucous membranes are moist.   Eyes:      General: Lids are normal.      Extraocular Movements: Extraocular movements intact.      Conjunctiva/sclera: Conjunctivae normal.      Pupils: Pupils are equal, round, and reactive to light.   Pulmonary:      Effort: Pulmonary effort is normal. No respiratory distress.   Abdominal:      Palpations: Abdomen is soft.      Tenderness: There is no abdominal tenderness.   Musculoskeletal:         General: Normal range of motion.      Cervical back: Normal range of motion.   Skin:     General: Skin is warm and dry.   Neurological:      Mental Status: He is alert.      Motor: Motor strength is normal.     Coordination: Romberg sign negative.      Deep Tendon Reflexes: Reflexes are normal and symmetric.   Psychiatric:         Attention and Perception: Attention and perception normal.         Mood and Affect: Mood and affect normal.         Speech: Speech normal.         " Behavior: Behavior normal. Behavior is cooperative.         Thought Content: Thought content normal.         Cognition and Memory: Cognition and memory normal.         Judgment: Judgment normal.         Neurological Exam  Mental Status  Alert. Oriented to person, place, time and situation. Memory is normal. Recent and remote memory are intact. Speech is normal. Language is fluent with no aphasia. Attention and concentration are normal. Fund of knowledge is appropriate for level of education.    Cranial Nerves  CN II: Visual acuity is normal. Visual fields full to confrontation.  CN III, IV, VI: Extraocular movements intact bilaterally. Normal lids and orbits bilaterally. Pupils equal round and reactive to light bilaterally.  CN V: Facial sensation is normal.  CN VII: Full and symmetric facial movement.  CN VIII:  Right: Hearing is normal.  Left: Hearing is normal.  CN IX, X: Palate elevates symmetrically. Normal gag reflex.  CN XI: Shoulder shrug strength is normal.  CN XII: Tongue midline without atrophy or fasciculations.    Motor  Normal muscle bulk throughout. Normal muscle tone. No abnormal involuntary movements. Strength is 5/5 throughout all four extremities.    Sensory  Light touch is normal in upper and lower extremities. Temperature is normal in upper and lower extremities. Vibration is normal in upper and lower extremities. Proprioception is normal in upper and lower extremities.     Reflexes  Deep tendon reflexes are 2+ and symmetric in all four extremities.    Right pathological reflexes: Dilshad's absent.  Left pathological reflexes: Dilshad's absent.    Coordination  Right: Finger-to-nose normal. Rapid alternating movement normal. Heel-to-shin normal.Left: Finger-to-nose normal. Rapid alternating movement normal. Heel-to-shin normal.    Gait  Casual gait is normal including stance, stride, and arm swing.Normal toe walking. Normal heel walking. Normal tandem gait. Romberg is absent. Able to rise from  chair without using arms.        ROS:    Review of Systems   Constitutional:  Negative for chills and fever.   HENT:  Negative for ear pain and sore throat.    Eyes:  Negative for pain and visual disturbance.   Respiratory:  Positive for shortness of breath. Negative for cough.    Cardiovascular:  Negative for chest pain and palpitations.   Gastrointestinal:  Negative for abdominal pain and vomiting.   Genitourinary:  Negative for dysuria and hematuria.   Musculoskeletal:  Negative for arthralgias and back pain.   Skin:  Negative for color change and rash.   Neurological:  Positive for dizziness, tremors, light-headedness and numbness. Negative for seizures and syncope.   Psychiatric/Behavioral:  The patient is nervous/anxious.    All other systems reviewed and are negative.      ROS reviewed and d/w pt.     I have spent a total time of 45 minutes in caring for this patient on the day of the visit/encounter including Diagnostic results, Instructions for management, Patient and family education, Counseling / Coordination of care, Documenting in the medical record, Reviewing / ordering tests, medicine, procedures  , Obtaining or reviewing history  , and Communicating with other healthcare professionals .

## 2024-07-12 NOTE — PATIENT INSTRUCTIONS
Increase water intake, need to over compensate for your increased sweating and work.  Can drink gatorade, avoid caffeine and sugary drinks  Will get BMP, CBC, Vitamin D, B1,B12, Folate, MMA, TSH, copper and zinc  Will get MRI w/wo contrast for dizziness, movement sensation, near syncope and paresthesia  Echo for positional SOB with HTN  Referral for PCP for ongoing medical management  Follow up with Neurology office in 3 months or sooner if needed.

## 2024-07-30 ENCOUNTER — HOSPITAL ENCOUNTER (OUTPATIENT)
Dept: NON INVASIVE DIAGNOSTICS | Facility: HOSPITAL | Age: 34
Discharge: HOME | End: 2024-07-30
Payer: COMMERCIAL

## 2024-07-30 VITALS
DIASTOLIC BLOOD PRESSURE: 70 MMHG | SYSTOLIC BLOOD PRESSURE: 179 MMHG | HEART RATE: 94 BPM | BODY MASS INDEX: 28.34 KG/M2 | WEIGHT: 187 LBS | HEIGHT: 68 IN

## 2024-07-30 DIAGNOSIS — R06.02 SHORTNESS OF BREATH: ICD-10-CM

## 2024-07-30 DIAGNOSIS — R55 NEAR SYNCOPE: ICD-10-CM

## 2024-07-30 DIAGNOSIS — I10 HYPERTENSION: ICD-10-CM

## 2024-07-30 LAB
AORTIC ROOT: 3.4 CM
APICAL FOUR CHAMBER EJECTION FRACTION: 70 %
BSA FOR ECHO PROCEDURE: 1.99 M2
E WAVE DECELERATION TIME: 161 MS
E/A RATIO: 1.39
FRACTIONAL SHORTENING: 43 (ref 28–44)
INTERVENTRICULAR SEPTUM IN DIASTOLE (PARASTERNAL SHORT AXIS VIEW): 0.9 CM
INTERVENTRICULAR SEPTUM: 0.9 CM (ref 0.6–1.1)
LAAS-AP2: 16.9 CM2
LAAS-AP4: 18.3 CM2
LEFT ATRIUM SIZE: 3.8 CM
LEFT ATRIUM VOLUME (MOD BIPLANE): 48 ML
LEFT ATRIUM VOLUME INDEX (MOD BIPLANE): 24.1 ML/M2
LEFT INTERNAL DIMENSION IN SYSTOLE: 2.9 CM (ref 2.1–4)
LEFT VENTRICULAR INTERNAL DIMENSION IN DIASTOLE: 5.1 CM (ref 3.5–6)
LEFT VENTRICULAR POSTERIOR WALL IN END DIASTOLE: 1 CM
LEFT VENTRICULAR STROKE VOLUME: 91 ML
LVSV (TEICH): 91 ML
MV E'TISSUE VEL-LAT: 17 CM/S
MV E'TISSUE VEL-SEP: 12 CM/S
MV PEAK A VEL: 0.59 M/S
MV PEAK E VEL: 82 CM/S
MV STENOSIS PRESSURE HALF TIME: 47 MS
MV VALVE AREA P 1/2 METHOD: 4.68
RIGHT ATRIUM AREA SYSTOLE A4C: 15.1 CM2
RIGHT VENTRICLE ID DIMENSION: 3.4 CM
SL CV LEFT ATRIUM LENGTH A2C: 5 CM
SL CV LV EF: 70
SL CV PED ECHO LEFT VENTRICLE DIASTOLIC VOLUME (MOD BIPLANE) 2D: 125 ML
SL CV PED ECHO LEFT VENTRICLE SYSTOLIC VOLUME (MOD BIPLANE) 2D: 33 ML
TR MAX PG: 10 MMHG
TR PEAK VELOCITY: 1.6 M/S
TRICUSPID ANNULAR PLANE SYSTOLIC EXCURSION: 2.2 CM
TRICUSPID VALVE PEAK REGURGITATION VELOCITY: 1.6 M/S

## 2024-07-30 PROCEDURE — 93306 TTE W/DOPPLER COMPLETE: CPT

## 2024-07-30 PROCEDURE — 93306 TTE W/DOPPLER COMPLETE: CPT | Performed by: INTERNAL MEDICINE

## 2024-07-31 ENCOUNTER — TELEPHONE (OUTPATIENT)
Dept: NEUROLOGY | Facility: CLINIC | Age: 34
End: 2024-07-31

## 2024-07-31 NOTE — TELEPHONE ENCOUNTER
----- Message from DEACON Salazar sent at 7/30/2024  4:51 PM EDT -----  Please call the patient regarding his result.    Echo looks good, heart functioning well.

## 2024-08-08 ENCOUNTER — HOSPITAL ENCOUNTER (OUTPATIENT)
Dept: RADIOLOGY | Facility: HOSPITAL | Age: 34
Discharge: HOME | End: 2024-08-08
Payer: COMMERCIAL

## 2024-08-08 ENCOUNTER — APPOINTMENT (OUTPATIENT)
Dept: LAB | Facility: HOSPITAL | Age: 34
End: 2024-08-08
Payer: COMMERCIAL

## 2024-08-08 DIAGNOSIS — R20.2 PARESTHESIA: ICD-10-CM

## 2024-08-08 DIAGNOSIS — R06.02 SHORTNESS OF BREATH: ICD-10-CM

## 2024-08-08 DIAGNOSIS — I10 HYPERTENSION: ICD-10-CM

## 2024-08-08 DIAGNOSIS — R55 NEAR SYNCOPE: ICD-10-CM

## 2024-08-08 DIAGNOSIS — E55.9 VITAMIN D DEFICIENCY: ICD-10-CM

## 2024-08-08 LAB
25(OH)D3 SERPL-MCNC: 23.5 NG/ML (ref 30–100)
ANION GAP SERPL CALCULATED.3IONS-SCNC: 7 MMOL/L (ref 4–13)
BASOPHILS # BLD AUTO: 0.1 THOUSANDS/ÂΜL (ref 0–0.1)
BASOPHILS NFR BLD AUTO: 1 % (ref 0–1)
BUN SERPL-MCNC: 21 MG/DL (ref 5–25)
CALCIUM SERPL-MCNC: 9.2 MG/DL (ref 8.4–10.2)
CHLORIDE SERPL-SCNC: 105 MMOL/L (ref 96–108)
CO2 SERPL-SCNC: 28 MMOL/L (ref 21–32)
CREAT SERPL-MCNC: 0.94 MG/DL (ref 0.6–1.3)
EOSINOPHIL # BLD AUTO: 0.53 THOUSAND/ÂΜL (ref 0–0.61)
EOSINOPHIL NFR BLD AUTO: 8 % (ref 0–6)
ERYTHROCYTE [DISTWIDTH] IN BLOOD BY AUTOMATED COUNT: 12 % (ref 11.6–15.1)
FOLATE SERPL-MCNC: 9.6 NG/ML
GFR SERPL CREATININE-BSD FRML MDRD: 104 ML/MIN/1.73SQ M
GLUCOSE SERPL-MCNC: 89 MG/DL (ref 65–140)
HCT VFR BLD AUTO: 40.9 % (ref 36.5–49.3)
HGB BLD-MCNC: 14 G/DL (ref 12–17)
IMM GRANULOCYTES # BLD AUTO: 0.05 THOUSAND/UL (ref 0–0.2)
IMM GRANULOCYTES NFR BLD AUTO: 1 % (ref 0–2)
LYMPHOCYTES # BLD AUTO: 1.44 THOUSANDS/ÂΜL (ref 0.6–4.47)
LYMPHOCYTES NFR BLD AUTO: 21 % (ref 14–44)
MCH RBC QN AUTO: 31.1 PG (ref 26.8–34.3)
MCHC RBC AUTO-ENTMCNC: 34.2 G/DL (ref 31.4–37.4)
MCV RBC AUTO: 91 FL (ref 82–98)
MONOCYTES # BLD AUTO: 0.79 THOUSAND/ÂΜL (ref 0.17–1.22)
MONOCYTES NFR BLD AUTO: 11 % (ref 4–12)
NEUTROPHILS # BLD AUTO: 4.02 THOUSANDS/ÂΜL (ref 1.85–7.62)
NEUTS SEG NFR BLD AUTO: 58 % (ref 43–75)
NRBC BLD AUTO-RTO: 0 /100 WBCS
PLATELET # BLD AUTO: 312 THOUSANDS/UL (ref 149–390)
PMV BLD AUTO: 9.4 FL (ref 8.9–12.7)
POTASSIUM SERPL-SCNC: 3.5 MMOL/L (ref 3.5–5.3)
RBC # BLD AUTO: 4.5 MILLION/UL (ref 3.88–5.62)
SODIUM SERPL-SCNC: 140 MMOL/L (ref 135–147)
T4 FREE SERPL-MCNC: 0.73 NG/DL (ref 0.61–1.12)
TSH SERPL DL<=0.05 MIU/L-ACNC: 1.68 UIU/ML (ref 0.45–4.5)
VIT B12 SERPL-MCNC: 182 PG/ML (ref 180–914)
WBC # BLD AUTO: 6.93 THOUSAND/UL (ref 4.31–10.16)

## 2024-08-08 PROCEDURE — 84630 ASSAY OF ZINC: CPT

## 2024-08-08 PROCEDURE — A9585 GADOBUTROL INJECTION: HCPCS | Performed by: FAMILY MEDICINE

## 2024-08-08 PROCEDURE — 82306 VITAMIN D 25 HYDROXY: CPT

## 2024-08-08 PROCEDURE — 84439 ASSAY OF FREE THYROXINE: CPT

## 2024-08-08 PROCEDURE — 82607 VITAMIN B-12: CPT

## 2024-08-08 PROCEDURE — 82525 ASSAY OF COPPER: CPT

## 2024-08-08 PROCEDURE — 84443 ASSAY THYROID STIM HORMONE: CPT

## 2024-08-08 PROCEDURE — 80048 BASIC METABOLIC PNL TOTAL CA: CPT

## 2024-08-08 PROCEDURE — 70553 MRI BRAIN STEM W/O & W/DYE: CPT

## 2024-08-08 PROCEDURE — 84425 ASSAY OF VITAMIN B-1: CPT

## 2024-08-08 PROCEDURE — 82746 ASSAY OF FOLIC ACID SERUM: CPT

## 2024-08-08 PROCEDURE — 85025 COMPLETE CBC W/AUTO DIFF WBC: CPT

## 2024-08-08 PROCEDURE — 36415 COLL VENOUS BLD VENIPUNCTURE: CPT

## 2024-08-08 PROCEDURE — 83918 ORGANIC ACIDS TOTAL QUANT: CPT

## 2024-08-08 RX ORDER — GADOBUTROL 604.72 MG/ML
7.5 INJECTION INTRAVENOUS
Status: COMPLETED | OUTPATIENT
Start: 2024-08-08 | End: 2024-08-08

## 2024-08-08 RX ADMIN — GADOBUTROL 7.5 ML: 604.72 INJECTION INTRAVENOUS at 14:48

## 2024-08-12 ENCOUNTER — TELEPHONE (OUTPATIENT)
Dept: NEUROLOGY | Facility: CLINIC | Age: 34
End: 2024-08-12

## 2024-08-12 NOTE — TELEPHONE ENCOUNTER
----- Message from DEACON Salazar sent at 8/9/2024  6:53 PM EDT -----  Please call the patient regarding his abnormal result.    Pt has Vitamin D insufficiency and bordering on insufficiency of vitamin B12 as well.   Would recommend that he start an over the counter replacement with both.   Vitamin D 2000u daily and Vitamin B12 with 500mcg daily.    I also looked at his MRI, this was a normal study and no indication for changes at this time.     Franky

## 2024-08-13 LAB
COPPER SERPL-MCNC: 91 UG/DL (ref 69–132)
ZINC SERPL-MCNC: 63 UG/DL (ref 44–115)

## 2024-08-13 NOTE — TELEPHONE ENCOUNTER
Pt called to confirm [facility] Medication Vitamin D 2000u daily and Vitamin B12 with 500mcg daily.     Pt understood.

## 2024-08-14 LAB — VIT B1 BLD-SCNC: 130.6 NMOL/L (ref 66.5–200)

## 2024-08-16 LAB — METHYLMALONATE SERPL-SCNC: 214 NMOL/L (ref 0–378)

## 2025-02-07 ENCOUNTER — OFFICE VISIT (OUTPATIENT)
Dept: FAMILY MEDICINE CLINIC | Facility: CLINIC | Age: 35
End: 2025-02-07
Payer: COMMERCIAL

## 2025-02-07 VITALS
DIASTOLIC BLOOD PRESSURE: 78 MMHG | HEART RATE: 80 BPM | SYSTOLIC BLOOD PRESSURE: 120 MMHG | BODY MASS INDEX: 28.95 KG/M2 | WEIGHT: 191 LBS | HEIGHT: 68 IN | OXYGEN SATURATION: 98 %

## 2025-02-07 DIAGNOSIS — E53.8 B12 DEFICIENCY: ICD-10-CM

## 2025-02-07 DIAGNOSIS — Z72.0 TOBACCO USE: ICD-10-CM

## 2025-02-07 DIAGNOSIS — E78.2 MIXED HYPERLIPIDEMIA: ICD-10-CM

## 2025-02-07 DIAGNOSIS — R06.02 SHORTNESS OF BREATH: ICD-10-CM

## 2025-02-07 DIAGNOSIS — R55 NEAR SYNCOPE: ICD-10-CM

## 2025-02-07 DIAGNOSIS — Z00.00 WELL ADULT EXAM: ICD-10-CM

## 2025-02-07 DIAGNOSIS — I10 HYPERTENSION: ICD-10-CM

## 2025-02-07 DIAGNOSIS — E55.9 VITAMIN D DEFICIENCY: Primary | ICD-10-CM

## 2025-02-07 PROBLEM — Z23 IMMUNIZATION DUE: Status: RESOLVED | Noted: 2024-03-25 | Resolved: 2025-02-07

## 2025-02-07 PROCEDURE — G2211 COMPLEX E/M VISIT ADD ON: HCPCS | Performed by: FAMILY MEDICINE

## 2025-02-07 PROCEDURE — 99213 OFFICE O/P EST LOW 20 MIN: CPT | Performed by: FAMILY MEDICINE

## 2025-02-07 PROCEDURE — 99385 PREV VISIT NEW AGE 18-39: CPT | Performed by: FAMILY MEDICINE

## 2025-02-07 NOTE — PROGRESS NOTES
Name: Lucho Hall      : 1990      MRN: [id number]  Encounter Provider: Pj Crespo MD  Encounter Date: 2025   Encounter department: [facility]    Assessment & Plan  Vitamin D deficiency    Orders:    Vitamin D 25 hydroxy    B12 deficiency    Orders:    Vitamin B12    Mixed hyperlipidemia    Orders:    Lipid Panel with Direct LDL reflex    Near syncope    Orders:    [facility]    Tobacco use         Well adult exam    Orders:    Vitamin D 25 hydroxy    Vitamin B12    CBC and differential    Comprehensive metabolic panel    Lipid Panel with Direct LDL reflex    Shortness of breath    Orders:    [facility]ulatory referral to [facility]    Hypertension    Orders:    [facility]ulatory referral to [facility]    Near syncope    Orders:    [facility]      Assessment & Plan  1. Dizziness.  The patient's echocardiogram results were within normal limits, with a slight elevation in calculated pulmonary pressure, likely due to suboptimal imaging conditions. The CT scan of the head and neck, both with and without contrast, did not reveal any abnormalities such as aneurysms or malignancies. An MRI of the brain was also unremarkable, except for a mild sinus infection. His vitamin D level was found to be 23, and his B12 level was marginally low. He is advised to continue his current lifestyle modifications, including regular exercise and a balanced diet. He is also encouraged to maintain his smoking cessation efforts. A fasting blood test will be conducted next week to reassess his B12 and D levels, as well as his cholesterol levels. He is advised to consult an ophthalmologist for a comprehensive eye examination.    2. Shortness of Breath.  The patient reports occasional shortness of breath, particularly while driving. This may be related to stress or anxiety. He is advised to continue using stress reduction techniques such as listening  to music and engaging in regular physical activity. If symptoms persist, further evaluation may be necessary.    3. Vitamin D Deficiency.  His vitamin D level was found to be 23. He is advised to continue taking vitamin D supplements. A fasting blood test will be conducted next week to reassess his vitamin D levels.    4. Vitamin B12 Deficiency.  His B12 level was marginally low. He is advised to continue taking B12 supplements. A fasting blood test will be conducted next week to reassess his B12 levels.    Follow-up  The patient will follow up in 1 year, or earlier if necessary.       History of Present Illness     History of Present Illness  The patient is a 35-year-old male who presents for dizziness, shortness of breath, vitamin D and B12 deficiency.    He experienced an episode of dizziness and shortness of breath while driving, necessitating him to pull over and seek assistance from his family members. Despite being close to his residence, he was unable to continue driving home and was subsequently taken to the emergency room by his mother. He underwent a series of tests including an MRI, EKG, and echocardiogram. He reports no episodes of syncope or dizziness since the incident. However, he has been experiencing similar symptoms, particularly when driving, for several weeks prior to the episode. He has not sought ophthalmological evaluation yet. He does not engage in video xiomara activities. He occasionally experiences dyspnea, particularly during driving, but manages this by increasing the volume of his music and attempting to relax. 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 He ensures appropriate use of personal protective equipment (PPE) at work. He reports overall good health.    He has been supplementing his diet with vitamins B12 and D, as recommended by Dr. Javier.    He has abstained from alcohol consumption since January 2025  "and has noticed weight loss. He has made dietary modifications, eliminating bread and carbohydrates, and incorporating more meat and vegetables. He has also initiated a regular exercise regimen, using a rowing machine three times a week. He has ceased smoking for the past 9 months.    SOCIAL HISTORY  - Quit smoking 9 months ago  - Has not consumed alcohol since January 2025  - Works as a     MEDICATIONS  - Current: B12  - Current: Vitamin D     Review of Systems   Constitutional:  Negative for activity change, appetite change and fever.   HENT:  Negative for congestion, nosebleeds and trouble swallowing.    Eyes:  Negative for itching.   Respiratory:  Negative for cough and chest tightness.    Cardiovascular:  Negative for chest pain and palpitations.   Gastrointestinal:  Negative for abdominal pain, constipation, diarrhea and nausea.   Endocrine: Negative for cold intolerance.   Genitourinary:  Negative for frequency.   Musculoskeletal:  Negative for gait problem and joint swelling.   Skin:  Negative for rash.   Allergic/Immunologic: Negative for immunocompromised state.   Neurological:  Negative for dizziness, tremors, seizures, syncope and headaches.   Psychiatric/Behavioral:  Negative for hallucinations and suicidal ideas.      Objective   /78   Pulse 80   Ht 5' 8\" (1.727 m)   Wt 86.6 kg (191 lb)   SpO2 98%   BMI 29.04 kg/m²     Physical Exam    Physical Exam  Vitals and nursing note reviewed.   Constitutional:       Appearance: He is well-developed.   HENT:      Head: Normocephalic and atraumatic.   Eyes:      Conjunctiva/sclera: Conjunctivae normal.      Pupils: Pupils are equal, round, and reactive to light.   Cardiovascular:      Rate and Rhythm: Normal rate and regular rhythm.      Heart sounds: Normal heart sounds.   Pulmonary:      Effort: Pulmonary effort is normal.      Breath sounds: Normal breath sounds. No wheezing or rales.   Abdominal:      General: Bowel sounds are normal. There " is no distension.      Palpations: Abdomen is soft.      Tenderness: There is no abdominal tenderness.   Musculoskeletal:         General: No tenderness. Normal range of motion.      Cervical back: Normal range of motion and neck supple.   Skin:     General: Skin is warm and dry.      Findings: No rash.   Neurological:      Mental Status: He is alert and oriented to person, place, and time.      Cranial Nerves: No cranial nerve deficit.      Sensory: No sensory deficit.      Coordination: Coordination normal.   Psychiatric:         Behavior: Behavior normal.         Thought Content: Thought content normal.         Judgment: Judgment normal.

## 2025-02-12 ENCOUNTER — TELEPHONE (OUTPATIENT)
Age: 35
End: 2025-02-12

## 2025-02-12 NOTE — TELEPHONE ENCOUNTER
Spoke with patient and patient confirmed new office address/location and appointment with Franky.

## 2025-02-19 ENCOUNTER — OFFICE VISIT (OUTPATIENT)
Age: 35
End: 2025-02-19
Payer: COMMERCIAL

## 2025-02-19 VITALS
HEART RATE: 74 BPM | DIASTOLIC BLOOD PRESSURE: 86 MMHG | SYSTOLIC BLOOD PRESSURE: 104 MMHG | WEIGHT: 188 LBS | HEIGHT: 68 IN | BODY MASS INDEX: 28.49 KG/M2

## 2025-02-19 DIAGNOSIS — R55 NEAR SYNCOPE: ICD-10-CM

## 2025-02-19 DIAGNOSIS — R06.02 SHORTNESS OF BREATH: Primary | ICD-10-CM

## 2025-02-19 DIAGNOSIS — R20.2 PARESTHESIA: ICD-10-CM

## 2025-02-19 PROCEDURE — 99214 OFFICE O/P EST MOD 30 MIN: CPT | Performed by: NURSE PRACTITIONER

## 2025-02-19 NOTE — PROGRESS NOTES
Name: Lucho Hall      : 1990      MRN: [id number]  Encounter Provider: DEACON Roper  Encounter Date: 2025   Encounter department: [facility]  :  Assessment & Plan  Shortness of breath         Near syncope         Paresthesia           Patient Instructions   Resume vitamin regimen with Vitamin D and B12.  Consider starting on magnesium 400mg daily  Continue with smoking and alcohol cessation  If increased episodes of Shortness of Breath and anxiety, d/w Dr. Crespo options of medication of anxiety  Continue with good hydration  Stress reduction techniques  Follow up with Neurology office in 1yr or sooner if needed     History of Present Illness   Lucho Hall is a 35yo male who presented to the Neurology office for management of dizziness after having episode of dizziness, shortness of breath and tingling sensation of the hands.  3/25/2024 patient seen by his PCP.  Received some blood work which was normal.  2024 went to urgent care due to onset of dizziness with shortness of breath and tingling sensation in his hands.  He had a foggy type sensation in the head reported happening over the course of a few weeks.  He was hypertensive with a blood pressure of 162/112.  Patient referred to the ER, upon arrival to the ER continued with dizziness and paresthesia in the bilateral hands.  Pulmonary wise he was hyperventilating.  He does have history of smoking for approximately 10 to 15 years, also has alcohol use at that point in time.  He has since quit both.  Patient reported dizzy sensation of feeling off balance, there was no room spinning type dizziness.  He is outside all day and 83 degree weather, taking trenches for electrical work.  He did not feel as though he is drinking enough and felt this may have been part of his symptoms.  He reported a breakfast sandwich in the morning and a bag of chips with coffee.  He felt as though he  was going to pass out while he was driving, he pulled over his mother picked him up to take him to the ER.  On arrival he had no focal deficits, he was alert and oriented with no visual field deficits, ataxia or sensorimotor deficits.  He did have slightly elevated temperature at 99.7 and his blood pressure had decreased to 139/87.  Patient had no significant abnormal labs with the exception of a creatinine of 1.4.  CTA of the head and neck showed no intracranial mass, mass effect, no sign of acute infarction or acute parenchymal hemorrhage.  Had scattered mucoperiosteal thickening, no fluid.  Soft tissue/adenoidal prominence within the posterior nasopharynx, within normal limits for his age.  Cervical vessels with no stenosis or dissection, intracranial vessels also no stenosis, occlusion.  Bony structures, soft tissue thoracic inlet were all unremarkable.  Chest x-ray completed, lungs were clear, no acute pulmonary disease seen.  Patient then reported ported several events similar over the course of 4 to 5 weeks, each time while he was driving generally to her from work.  Last episode more severe causing him to pull over as documented above.  He is noting weird sensation of pins-and-needles to the back of his neck and head without any visual or focal deficits.  He is not sure if he has anxiety but felt short of breath with hyperventilation.  He had cardiac workup which was negative for any abnormalities.  He had reported ongoing issues with paresthesia primarily to the fourth and fifth fingers left greater than right.  Stated if he got up and walked around, this sensation would resolve.  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  Tries to hydrate well, notes drinking approximately 2 L of water a day.  Encourage patient to increase oral hydration during hot days with increased sweat and  insensible water loss.  Recommended MRI of the brain with and without contrast which was negative for acute abnormalities.  Patient did have lab work completed, had a mildly decreased vitamin D level with low normal vitamin B12 for which replacements were recommended and started.    Patient Patient reported back to the neurology office today, indicated no further incidents of near syncop episodes or dizziness.  He on occasion does get short of breath, generally in the car to and from work.  He also continues to have intermittent episodes of paresthesia noted to the posterior portion of his neck and head.  Patient feels this may be related to increased stressors and anxiety related to his job and/or all the activities that he has been engaged in.  Patient is not on any antianxiety medication nor is he on antidepressants.  He has nothing for panic attack.  With regards to his shortness of breath, he notes that when he starts realizing he has become short of breath, he slows down and refocuses.  This does help to calm his breathing down and limits his discomforts.  Patient reports that he has changed his diet, he stopped smoking and drinking.  He was taking his vitamin replacements regularly however over the course of time since stopped taking them as frequently as he had been.  Overall patient reports that he is trying to live a healthier life.    We talked about stressors causing some of his symptoms, he should be encouraged for stress reduction techniques.  If he is noticing an increased amount of anxiety and/or symptoms related to anxiety recommended touching base with his new PCP to discuss possible treatment of his anxiety overall.  Patient reports that he does get some occasional headaches, he will take Advil to alleviate them.  Indicating that these are not out of the ordinary type headaches, no episodes of migrainous type activity.  He is a smoker however his PCP office evaluation with no indication of  pulmonary abnormalities.  With regards to sleep, headaches, dizziness, muscle cramping and or anxiety, recommend the patient started on magnesium 400 mg daily.  Can initiate this over-the-counter option with his vitamin D and B12 options.  Patient did receive new blood work from his PCP, will be checking his vitamin levels for any improvements.  Patient is going to resume his replacement regimen.  Currently patient is not having any prior symptomology.  He does report having some possible anxiety which can be continued to be monitored through his PCP.  Patient will follow-up in outpatient neurology office in 1 year however knows he can contact the office for sooner evaluation if needed.           Review of Systems   Constitutional:  Negative for chills and fever.   HENT:  Negative for ear pain and sore throat.    Eyes:  Negative for pain and visual disturbance.   Respiratory:  Positive for shortness of breath. Negative for cough.    Cardiovascular:  Negative for chest pain and palpitations.   Gastrointestinal:  Negative for abdominal pain and vomiting.   Genitourinary:  Negative for dysuria and hematuria.   Musculoskeletal:  Negative for arthralgias and back pain.   Skin:  Negative for color change and rash.   Neurological:  Positive for numbness and headaches. Negative for seizures and syncope.   Psychiatric/Behavioral:  The patient is nervous/anxious.    All other systems reviewed and are negative.     I have personally reviewed the MA's review of systems and made changes as necessary.    History reviewed. No pertinent past medical history.    Past Surgical History:   Procedure Laterality Date    APPENDECTOMY         Social History     Socioeconomic History    Marital status: Single     Spouse name: None    Number of children: None    Years of education: None    Highest education level: None   Occupational History    None   Tobacco Use    Smoking status: Former     Types: Cigarettes    Smokeless tobacco: Never  "  Vaping Use    Vaping status: Never Used   Substance and Sexual Activity    Alcohol use: Yes     Comment: daily 2-3 beers    Drug use: Yes     Types: Marijuana     Comment: monthly    Sexual activity: None   Other Topics Concern    None   Social History Narrative    None     Social Drivers of Health     Financial Resource Strain: Not on file   Food Insecurity: Not on file   Transportation Needs: Not on file   Physical Activity: Not on file   Stress: Not on file   Social Connections: Not on file   Intimate Partner Violence: Not on file   Housing Stability: Not on file       Family History   Problem Relation Age of Onset    No Known Problems Mother     Hypertension Father     Alcohol abuse Father        No current outpatient medications on file.    No Known Allergies     Blood pressure 104/86, pulse 74, height 5' 8\" (1.727 m), weight 85.3 kg (188 lb).       Objective   /86 (BP Location: Left arm, Patient Position: Sitting, Cuff Size: Large)   Pulse 74   Ht 5' 8\" (1.727 m)   Wt 85.3 kg (188 lb)   BMI 28.59 kg/m²     Physical Exam  Vitals reviewed.   Constitutional:       Appearance: Normal appearance. He is well-developed.   HENT:      Head: Normocephalic.      Right Ear: Hearing normal.      Left Ear: Hearing normal.      Nose: Nose normal.      Mouth/Throat:      Mouth: Mucous membranes are moist.   Eyes:      General: Lids are normal.      Extraocular Movements: Extraocular movements intact.      Conjunctiva/sclera: Conjunctivae normal.      Pupils: Pupils are equal, round, and reactive to light.   Pulmonary:      Effort: Pulmonary effort is normal. No respiratory distress.   Abdominal:      Palpations: Abdomen is soft.      Tenderness: There is no abdominal tenderness.   Musculoskeletal:         General: Normal range of motion.      Cervical back: Normal range of motion.   Skin:     General: Skin is warm and dry.   Neurological:      Mental Status: He is alert.      Motor: Motor strength is normal.     " Coordination: Romberg sign negative.      Deep Tendon Reflexes: Reflexes are normal and symmetric.   Psychiatric:         Attention and Perception: Attention and perception normal.         Mood and Affect: Mood and affect normal.         Speech: Speech normal.         Behavior: Behavior normal. Behavior is cooperative.         Thought Content: Thought content normal.         Cognition and Memory: Cognition and memory normal.         Judgment: Judgment normal.       Neurological Exam  Mental Status  Alert. Oriented to person, place, time and situation. Memory is normal. Recent and remote memory are intact. Speech is normal. Language is fluent with no aphasia. Attention and concentration are normal. Fund of knowledge is appropriate for level of education.    Cranial Nerves  CN II: Visual acuity is normal. Visual fields full to confrontation.  CN III, IV, VI: Extraocular movements intact bilaterally. Normal lids and orbits bilaterally. Pupils equal round and reactive to light bilaterally.  CN V: Facial sensation is normal.  CN VII: Full and symmetric facial movement.  CN VIII:  Right: Hearing is normal.  Left: Hearing is normal.  CN IX, X: Palate elevates symmetrically. Normal gag reflex.  CN XI: Shoulder shrug strength is normal.  CN XII: Tongue midline without atrophy or fasciculations.    Motor  Normal muscle bulk throughout. Normal muscle tone. No abnormal involuntary movements. Strength is 5/5 throughout all four extremities.    Sensory  Light touch is normal in upper and lower extremities. Temperature is normal in upper and lower extremities. Vibration is normal in upper and lower extremities. Proprioception is normal in upper and lower extremities.     Reflexes  Deep tendon reflexes are 2+ and symmetric in all four extremities.    Right pathological reflexes: Dilshad's absent.  Left pathological reflexes: Dilshad's absent.    Coordination  Right: Finger-to-nose normal. Rapid alternating movement normal.  Heel-to-shin normal.Left: Finger-to-nose normal. Rapid alternating movement normal. Heel-to-shin normal.    Gait  Casual gait is normal including stance, stride, and arm swing.Normal toe walking. Normal heel walking. Normal tandem gait. Romberg is absent. Able to rise from chair without using arms.      Radiology Results Review: I have reviewed radiology reports from 2024 including: MRI brain.    Administrative Statements   I have spent a total time of 35 minutes in caring for this patient on the day of the visit/encounter including Diagnostic results, Risks and benefits of tx options, Instructions for management, Patient and family education, Counseling / Coordination of care, Documenting in the medical record, Reviewing/placing orders in the medical record (including tests, medications, and/or procedures), and Obtaining or reviewing history  .

## 2025-02-19 NOTE — PATIENT INSTRUCTIONS
Resume vitamin regimen with Vitamin D and B12.  Consider starting on magnesium 400mg daily  Continue with smoking and alcohol cessation  If increased episodes of Shortness of Breath and anxiety, d/w Dr. Crespo options of medication of anxiety  Continue with good hydration  Stress reduction techniques  Follow up with Neurology office in 1yr or sooner if needed